# Patient Record
Sex: FEMALE | Race: WHITE
[De-identification: names, ages, dates, MRNs, and addresses within clinical notes are randomized per-mention and may not be internally consistent; named-entity substitution may affect disease eponyms.]

---

## 2017-09-09 ENCOUNTER — HEALTH MAINTENANCE LETTER (OUTPATIENT)
Age: 27
End: 2017-09-09

## 2018-07-05 ENCOUNTER — OFFICE VISIT (OUTPATIENT)
Dept: URGENT CARE | Facility: URGENT CARE | Age: 28
End: 2018-07-05
Payer: COMMERCIAL

## 2018-07-05 VITALS
SYSTOLIC BLOOD PRESSURE: 126 MMHG | TEMPERATURE: 97 F | HEART RATE: 70 BPM | WEIGHT: 200 LBS | BODY MASS INDEX: 27.12 KG/M2 | DIASTOLIC BLOOD PRESSURE: 78 MMHG
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
## 2018-07-05 DIAGNOSIS — L08.9 INFECTED INSECT BITE OF RIGHT FOOT, INITIAL ENCOUNTER: Primary | ICD-10-CM

## 2018-07-05 DIAGNOSIS — S90.861A INFECTED INSECT BITE OF RIGHT FOOT, INITIAL ENCOUNTER: Primary | ICD-10-CM

## 2018-07-05 PROCEDURE — 99203 OFFICE O/P NEW LOW 30 MIN: CPT | Performed by: INTERNAL MEDICINE

## 2018-07-05 RX ORDER — CEPHALEXIN 500 MG/1
500 CAPSULE ORAL 3 TIMES DAILY
Qty: 21 CAPSULE | Refills: 0 | Status: SHIPPED | OUTPATIENT
Start: 2018-07-05 | End: 2018-07-12

## 2018-07-05 ASSESSMENT — ENCOUNTER SYMPTOMS: FEVER: 0

## 2018-07-05 NOTE — LETTER
[facility]  [address] [address]  Phone: [phone]    July 5, 2018        Megan M Goltz  [address] [address]          To whom it may concern:    RE: Megan M Goltz    Patient was seen and treated today at our clinic.  May miss up to 3 days of work for illness.    Please contact me for questions or concerns.      Sincerely,        Mariel Bailey MD

## 2018-07-05 NOTE — MR AVS SNAPSHOT
After Visit Summary   7/5/2018    Megan M Goltz    MRN: [id number]           Patient Information     Date Of Birth          1990        Visit Information        Provider Department      7/5/2018 8:15 PM Mariel Bailey MD [facility]        Today's Diagnoses     Infected insect bite of right foot, initial encounter    -  1      Care Instructions    Keflex antibiotics 3 x day for 1 week  claritin or zyrtec  Cool compress.    Watch for spreading beyond markings, fever, concerns.    Call or return to clinic if symptoms worsen or fail to improve as anticipated.        Infected Insect Bite or Sting    When an insect stings you, it injects venom. When an insect bites you, it does not. Stings and bites may cause a local reaction. Or they may cause a reaction that affects your whole body. Bites and stings may become infected. Signs of infection include redness, warmth, pain, drainage of pus, and swelling. Infections will need treatment with antibiotics and should get better over the next 10 days.  Home care  The following will help you care for your bite or sting at home:    If a stinger is still in your skin, it will need to be removed. Don't use tweezers. Gently scrape the stinger from the side with a firm object such as the side of a credit card. This will loosen it and remove it from your skin.    If itching is a problem, applying ice packs to the sting area will help.    Wash the area with soap and water at least 3 times a day. Apply a topical antibiotic cream or ointment.    You can use an over-the counter antihistamine unless your doctor has given you a prescription antihistamine. You may use antihistamines to reduce itching if large areas of the skin are involved. Use lower doses during the daytime and higher doses at bedtime since the drug may make you sleepy. Don't use an antihistamine if you have glaucoma or if you are a man with trouble urinating due to an  enlarged prostate. Some antihistamines cause less drowsiness and are a good choice for daytime use.    If oral antibiotics have been prescribed, be sure to take them as directed until they are all finished.    You may use over-the-counter pain medicine to control pain, unless another pain medicine was prescribed. Talk with your doctor before using acetaminophen or ibuprofen if you have chronic liver or kidney disease. Also talk with your doctor if you have ever had a stomach ulcer or gastrointestinal bleeding.  Follow-up care  Follow up with your healthcare provider, or as advised if you don't get better over the next 2 days or if your symptoms get worse.  Call 911  Call 911 if any of these occur:    Swelling of the face, eyelids, mouth, throat, or tongue    Difficulty swallowing or breathing  When to seek medical advice  Call your healthcare provider right away if any of these occur:    Spreading areas of redness or swelling    Fever of 100.4 F (38 C) or higher, or as directed by your healthcare provider    Increased local pain    Headache, fever, chills, muscle or joint aching, or vomiting,    New rash  Date Last Reviewed: 10/1/2016    0148-5948 [employer]. [address] [place] [address]. All rights reserved. This information is not intended as a substitute for professional medical care. Always follow your healthcare professional's instructions.                Follow-ups after your visit        Who to contact     If you have questions or need follow up information about today's clinic visit or your schedule please contact [facility] directly at [phone].  Normal or non-critical lab and imaging results will be communicated to you by MyChart, letter or phone within 4 business days after the clinic has received the results. If you do not hear from us within 7 days, please contact the clinic through MyChart or phone. If you have a critical or abnormal lab result,  we will notify you by phone as soon as possible.  Submit refill requests through [email or web address] or call your pharmacy and they will forward the refill request to us. Please allow 3 business days for your refill to be completed.          Additional Information About Your Visit        Care EveryWhere ID     This is your Care EveryWhere ID. This could be used by other organizations to access your [facility] records  [id number]        Your Vitals Were     Pulse Temperature BMI (Body Mass Index)             70 97  F (36.1  C) (Tympanic) 27.12 kg/m2          Blood Pressure from Last 3 Encounters:   07/05/18 126/78   07/09/14 128/72   08/11/11 122/80    Weight from Last 3 Encounters:   07/05/18 200 lb (90.7 kg)   07/09/14 189 lb (85.7 kg)   08/11/11 196 lb (88.9 kg) (97 %)*     * Growth percentiles are based on [facility] 2-20 Years data.              Today, you had the following     No orders found for display         Today's Medication Changes          These changes are accurate as of 7/5/18  8:51 PM.  If you have any questions, ask your nurse or doctor.               Start taking these medicines.        Dose/Directions    cephALEXin 500 MG capsule   Commonly known as:  KEFLEX   Used for:  Infected insect bite of right foot, initial encounter   Started by:  Mariel Bailey MD        Dose:  500 mg   Take 1 capsule (500 mg) by mouth 3 times daily for 7 days   Quantity:  21 capsule   Refills:  0            Where to get your medicines      [facility] [address] [place] [address] AT [address] [address]    Hours:  24-hours Phone:  [phone]     cephALEXin 500 MG capsule                Primary Care Provider Office Phone # Fax #    Dary Wing [phone]       [address] [place] [address]        Equal Access to Services     KASSIDY SCOTT AH: Tasia Case,  wadarioda luqvilma, qaybta kaindu jacobson, zaira beardenaan ah. So [facility] [phone].    ATENCIÓN: Si shannon valle, tiene a baird disposición servicios gratuitos de asistencia lingüística. Nataly al [phone].    We comply with applicable federal civil rights laws and [place] laws. We do not discriminate on the basis of race, color, national origin, age, disability, sex, sexual orientation, or gender identity.            Thank you!     Thank you for choosing [facility]  for your care. Our goal is always to provide you with excellent care. Hearing back from our patients is one way we can continue to improve our services. Please take a few minutes to complete the written survey that you may receive in the mail after your visit with us. Thank you!             Your Updated Medication List - Protect others around you: Learn how to safely use, store and throw away your medicines at www.disposemymeds.org.          This list is accurate as of 7/5/18  8:51 PM.  Always use your most recent med list.                   Brand Name Dispense Instructions for use Diagnosis    albuterol 108 (90 Base) MCG/ACT Inhaler    PROAIR HFA/PROVENTIL HFA/VENTOLIN HFA    1 Inhaler    Inhale 2 puffs into the lungs every 6 hours as needed for shortness of breath / dyspnea or wheezing    Mild intermittent asthma, uncomplicated       cephALEXin 500 MG capsule    KEFLEX    21 capsule    Take 1 capsule (500 mg) by mouth 3 times daily for 7 days    Infected insect bite of right foot, initial encounter       CLARITIN 10 MG tablet   Generic drug:  loratadine          1 TAB PO QD (Once per day) as needed for ALLERGY SYMPTOMS        NUVARING 0.12-0.015 MG/24HR vaginal ring   Generic drug:  etonogestrel-ethinyl estradiol     3 each    PLACE 1 RING EVERY 21 DAYS THEN REMOVE FOR 1 WEEK.    Unspecified contraceptive management       peak flow meter Lavinia     1    use prn.        valACYclovir 500 MG tablet     VALTREX    16 tablet    4 po q 12hrs x 1 d (cold sores)    Herpes simplex without mention of complication

## 2018-07-06 NOTE — PROGRESS NOTES
SUBJECTIVE:   Megan M Goltz is a 27 year old female presenting with a chief complaint of   Chief Complaint   Patient presents with     Insect Bites     bottom of Rt foot x 2 days, it is red with a white ring around it, states it stings and is painful, states is allergic to deer fly bite.       She is a new patient of [facility].    Bite/Sting    Onset of symptoms was 2 day(s) ago.    Location: right bottom foot  Context: [place] friend[place] this past weekend  Current and Associated symptoms: pain and throbbing  Slight itch  Hard to stand at work  Treatment measures tried include: topical crushed up to benadryl tabs  Relief from treatment: none  Significant past medical history: history of prior reactions  -  Needed steroids in past    Review of Systems   Constitutional: Negative for fever.       Past Medical History:   Diagnosis Date     Mild intermittent asthma     worse with exercise     Family History   Problem Relation Age of Onset     Diabetes Maternal Grandfather      type II     Hypertension Paternal Grandfather      Respiratory Maternal Aunt      bad asthma and allergies     Respiratory Paternal Uncle      asthma and allergies     Current Outpatient Prescriptions   Medication Sig Dispense Refill     albuterol (PROAIR HFA, PROVENTIL HFA, VENTOLIN HFA) 108 (90 BASE) MCG/ACT inhaler Inhale 2 puffs into the lungs every 6 hours as needed for shortness of breath / dyspnea or wheezing 1 Inhaler 0     cephALEXin (KEFLEX) 500 MG capsule Take 1 capsule (500 mg) by mouth 3 times daily for 7 days 21 capsule 0     CLARITIN 10 MG OR TABS 1 TAB PO QD (Once per day) as needed for ALLERGY SYMPTOMS       NUVARING 0.12-0.015 MG/24HR vaginal ring PLACE 1 RING EVERY 21 DAYS THEN REMOVE FOR 1 WEEK. 3 each 2     PEAK FLOW METER EDUAR use prn.  1 0     valACYclovir (VALTREX) 500 MG tablet 4 po q 12hrs x 1 d (cold sores) 16 tablet 3     Social History   Substance Use Topics     Smoking status: Never Smoker     Smokeless tobacco: Never  Used     Alcohol use Yes      Comment: occ.          OBJECTIVE  /78  Pulse 70  Temp 97  F (36.1  C) (Tympanic)  Wt 200 lb (90.7 kg)  BMI 27.12 kg/m2    Physical Exam   Constitutional: She appears well-developed and well-nourished.   Skin:   right foot -   1cm central area firm with surrounding red/warm skin 3 x 5 cm   Area tender       Labs:  No results found for this or any previous visit (from the past 24 hour(s)).        ASSESSMENT:      ICD-10-CM    1. Infected insect bite of right foot, initial encounter S90.861A cephALEXin (KEFLEX) 500 MG capsule    L08.9     W57.XXXA         Medical Decision Making:  Elevate extremity  Work note for up to 3 days  Call or return to clinic if symptoms worsen or fail to improve as anticipated.      Patient Instructions   Keflex antibiotics 3 x day for 1 week  claritin or zyrtec  Cool compress.    Watch for spreading beyond markings, fever, concerns.    Call or return to clinic if symptoms worsen or fail to improve as anticipated.        Infected Insect Bite or Sting    When an insect stings you, it injects venom. When an insect bites you, it does not. Stings and bites may cause a local reaction. Or they may cause a reaction that affects your whole body. Bites and stings may become infected. Signs of infection include redness, warmth, pain, drainage of pus, and swelling. Infections will need treatment with antibiotics and should get better over the next 10 days.  Home care  The following will help you care for your bite or sting at home:    If a stinger is still in your skin, it will need to be removed. Don't use tweezers. Gently scrape the stinger from the side with a firm object such as the side of a credit card. This will loosen it and remove it from your skin.    If itching is a problem, applying ice packs to the sting area will help.    Wash the area with soap and water at least 3 times a day. Apply a topical antibiotic cream or ointment.    You can use an over-the  counter antihistamine unless your doctor has given you a prescription antihistamine. You may use antihistamines to reduce itching if large areas of the skin are involved. Use lower doses during the daytime and higher doses at bedtime since the drug may make you sleepy. Don't use an antihistamine if you have glaucoma or if you are a man with trouble urinating due to an enlarged prostate. Some antihistamines cause less drowsiness and are a good choice for daytime use.    If oral antibiotics have been prescribed, be sure to take them as directed until they are all finished.    You may use over-the-counter pain medicine to control pain, unless another pain medicine was prescribed. Talk with your doctor before using acetaminophen or ibuprofen if you have chronic liver or kidney disease. Also talk with your doctor if you have ever had a stomach ulcer or gastrointestinal bleeding.  Follow-up care  Follow up with your healthcare provider, or as advised if you don't get better over the next 2 days or if your symptoms get worse.  Call 911  Call 911 if any of these occur:    Swelling of the face, eyelids, mouth, throat, or tongue    Difficulty swallowing or breathing  When to seek medical advice  Call your healthcare provider right away if any of these occur:    Spreading areas of redness or swelling    Fever of 100.4 F (38 C) or higher, or as directed by your healthcare provider    Increased local pain    Headache, fever, chills, muscle or joint aching, or vomiting,    New rash  Date Last Reviewed: 10/1/2016    6986-5366 [employer]. [address] [place] [address], [place] [address]. All rights reserved. This information is not intended as a substitute for professional medical care. Always follow your healthcare professional's instructions.

## 2018-07-06 NOTE — PATIENT INSTRUCTIONS
Keflex antibiotics 3 x day for 1 week  claritin or zyrtec  Cool compress.    Watch for spreading beyond markings, fever, concerns.    Call or return to clinic if symptoms worsen or fail to improve as anticipated.        Infected Insect Bite or Sting    When an insect stings you, it injects venom. When an insect bites you, it does not. Stings and bites may cause a local reaction. Or they may cause a reaction that affects your whole body. Bites and stings may become infected. Signs of infection include redness, warmth, pain, drainage of pus, and swelling. Infections will need treatment with antibiotics and should get better over the next 10 days.  Home care  The following will help you care for your bite or sting at home:    If a stinger is still in your skin, it will need to be removed. Don't use tweezers. Gently scrape the stinger from the side with a firm object such as the side of a credit card. This will loosen it and remove it from your skin.    If itching is a problem, applying ice packs to the sting area will help.    Wash the area with soap and water at least 3 times a day. Apply a topical antibiotic cream or ointment.    You can use an over-the counter antihistamine unless your doctor has given you a prescription antihistamine. You may use antihistamines to reduce itching if large areas of the skin are involved. Use lower doses during the daytime and higher doses at bedtime since the drug may make you sleepy. Don't use an antihistamine if you have glaucoma or if you are a man with trouble urinating due to an enlarged prostate. Some antihistamines cause less drowsiness and are a good choice for daytime use.    If oral antibiotics have been prescribed, be sure to take them as directed until they are all finished.    You may use over-the-counter pain medicine to control pain, unless another pain medicine was prescribed. Talk with your doctor before using acetaminophen or ibuprofen if you have chronic liver or  kidney disease. Also talk with your doctor if you have ever had a stomach ulcer or gastrointestinal bleeding.  Follow-up care  Follow up with your healthcare provider, or as advised if you don't get better over the next 2 days or if your symptoms get worse.  Call 911  Call 911 if any of these occur:    Swelling of the face, eyelids, mouth, throat, or tongue    Difficulty swallowing or breathing  When to seek medical advice  Call your healthcare provider right away if any of these occur:    Spreading areas of redness or swelling    Fever of 100.4 F (38 C) or higher, or as directed by your healthcare provider    Increased local pain    Headache, fever, chills, muscle or joint aching, or vomiting,    New rash  Date Last Reviewed: 10/1/2016    3499-8110 [employer]. [address] [place] [address], [place] [address]. All rights reserved. This information is not intended as a substitute for professional medical care. Always follow your healthcare professional's instructions.

## 2018-08-03 ENCOUNTER — OFFICE VISIT (OUTPATIENT)
Dept: URGENT CARE | Facility: URGENT CARE | Age: 28
End: 2018-08-03
Payer: COMMERCIAL

## 2018-08-03 VITALS
HEART RATE: 80 BPM | TEMPERATURE: 99.1 F | SYSTOLIC BLOOD PRESSURE: 110 MMHG | OXYGEN SATURATION: 97 % | BODY MASS INDEX: 26.55 KG/M2 | WEIGHT: 196 LBS | DIASTOLIC BLOOD PRESSURE: 68 MMHG | HEIGHT: 72 IN

## 2018-08-03 DIAGNOSIS — R07.0 THROAT PAIN: ICD-10-CM

## 2018-08-03 DIAGNOSIS — J03.90 EXUDATIVE TONSILLITIS: Primary | ICD-10-CM

## 2018-08-03 LAB
DEPRECATED S PYO AG THROAT QL EIA: NORMAL
SPECIMEN SOURCE: NORMAL

## 2018-08-03 PROCEDURE — 87081 CULTURE SCREEN ONLY: CPT | Performed by: INTERNAL MEDICINE

## 2018-08-03 PROCEDURE — 99213 OFFICE O/P EST LOW 20 MIN: CPT | Performed by: INTERNAL MEDICINE

## 2018-08-03 PROCEDURE — 87880 STREP A ASSAY W/OPTIC: CPT | Performed by: INTERNAL MEDICINE

## 2018-08-03 RX ORDER — COPPER 313.4 MG/1
1 INTRAUTERINE DEVICE INTRAUTERINE ONCE
COMMUNITY

## 2018-08-03 ASSESSMENT — ENCOUNTER SYMPTOMS
COUGH: 0
FEVER: 0

## 2018-08-03 NOTE — PROGRESS NOTES
SUBJECTIVE:   Megan M Goltz is a 27 year old female presenting with a chief complaint of   Chief Complaint   Patient presents with     Urgent Care     Pharyngitis     3 days ago started to have sore throat, headache, body aches; today is hard to swallow.       She is an established patient of [facility].    URI Adult    Onset of symptoms was 3 day(s) ago.  Course of illness is worsening.    Current and Associated symptoms: sore throat, headache and body aches  Treatment measures tried include Tylenol/Ibuprofen.  Predisposing factors include ill contact: Work.        Review of Systems   Constitutional: Negative for fever.   HENT: Positive for postnasal drip.    Respiratory: Negative for cough.        Past Medical History:   Diagnosis Date     Mild intermittent asthma     worse with exercise     Family History   Problem Relation Age of Onset     Diabetes Maternal Grandfather      type II     Hypertension Paternal Grandfather      Respiratory Maternal Aunt      bad asthma and allergies     Respiratory Paternal Uncle      asthma and allergies     Current Outpatient Prescriptions   Medication Sig Dispense Refill     paragard intrauterine copper 1 each by Intrauterine route once       albuterol (PROAIR HFA, PROVENTIL HFA, VENTOLIN HFA) 108 (90 BASE) MCG/ACT inhaler Inhale 2 puffs into the lungs every 6 hours as needed for shortness of breath / dyspnea or wheezing (Patient not taking: Reported on 8/3/2018) 1 Inhaler 0     CLARITIN 10 MG OR TABS 1 TAB PO QD (Once per day) as needed for ALLERGY SYMPTOMS (Patient not taking: Reported on 8/3/2018)       NUVARING 0.12-0.015 MG/24HR vaginal ring PLACE 1 RING EVERY 21 DAYS THEN REMOVE FOR 1 WEEK. (Patient not taking: Reported on 8/3/2018) 3 each 2     PEAK FLOW METER EDUAR use prn.  1 0     valACYclovir (VALTREX) 500 MG tablet 4 po q 12hrs x 1 d (cold sores) (Patient not taking: Reported on 8/3/2018) 16 tablet 3     Social History   Substance Use Topics     Smoking status: Never  "Smoker     Smokeless tobacco: Never Used     Alcohol use Yes      Comment: occ.          OBJECTIVE  /68  Pulse 80  Temp 99.1  F (37.3  C) (Oral)  Ht 6' 1\" (1.854 m)  Wt 196 lb (88.9 kg)  LMP 08/01/2018 (Exact Date)  SpO2 97%  BMI 25.86 kg/m2    Physical Exam   Constitutional: She appears well-developed and well-nourished.   HENT:   Nose: Nose normal.   Mouth/Throat: Oropharyngeal exudate present.   Tm nl b     Cardiovascular: Normal rate, regular rhythm and normal heart sounds.    Pulmonary/Chest: Effort normal and breath sounds normal.   Lymphadenopathy:     She has cervical adenopathy.   Vitals reviewed.      Labs:  Results for orders placed or performed in visit on 08/03/18 (from the past 24 hour(s))   Strep, Rapid Screen   Result Value Ref Range    Specimen Description Throat     Rapid Strep A Screen       NEGATIVE: No Group A streptococcal antigen detected by immunoassay, await culture report.           ASSESSMENT:      ICD-10-CM    1. Exudative tonsillitis J03.90    2. Throat pain R07.0 Strep, Rapid Screen     Beta strep group A culture        Medical Decision Making:    Differential Diagnosis:  URI Adult/Peds:  Strep pharyngitis, Tonsilitis and Viral pharyngitis    Serious Comorbid Conditions:  Adult:  None    PLAN:    URI Adult:  Tylenol, Ibuprofen, Fluids and Rest  No ABX given  Followup:    If not improving or if condition worsens, follow up with your Primary Care Provider    Patient Instructions   rapid strep test negative   throat culture pending    Fluids  Rest           "

## 2018-08-03 NOTE — MR AVS SNAPSHOT
"              After Visit Summary   8/3/2018    Megan M Goltz    MRN: [id number]           Patient Information     Date Of Birth          1990        Visit Information        Provider Department      8/3/2018 5:55 PM Mariel Bailey MD [facility]        Today's Diagnoses     Throat pain    -  1       Follow-ups after your visit        Who to contact     If you have questions or need follow up information about today's clinic visit or your schedule please contact [facility] directly at [phone].  Normal or non-critical lab and imaging results will be communicated to you by MyChart, letter or phone within 4 business days after the clinic has received the results. If you do not hear from us within 7 days, please contact the clinic through MyChart or phone. If you have a critical or abnormal lab result, we will notify you by phone as soon as possible.  Submit refill requests through [employer] or call your pharmacy and they will forward the refill request to us. Please allow 3 business days for your refill to be completed.          Additional Information About Your Visit        Care EveryWhere ID     This is your Care EveryWhere ID. This could be used by other organizations to access your [place] medical records  [id number]        Your Vitals Were     Pulse Temperature Height Last Period Pulse Oximetry BMI (Body Mass Index)    80 99.1  F (37.3  C) (Oral) 6' 1\" (1.854 m) 08/01/2018 (Exact Date) 97% 25.86 kg/m2       Blood Pressure from Last 3 Encounters:   08/03/18 110/68   07/05/18 126/78   07/09/14 128/72    Weight from Last 3 Encounters:   08/03/18 196 lb (88.9 kg)   07/05/18 200 lb (90.7 kg)   07/09/14 189 lb (85.7 kg)              We Performed the Following     Strep, Rapid Screen        Primary Care Provider Office Phone # Fax #    Dary Wing [phone] [address] [facility] [address]        Equal Access " to Services     KASSIDY SCOTT : Tasia Case, wakwan sin, qachantal kabaldomerozaira hammond. So [facility] [phone].    ATENCIÓN: Si habla tori, tiene a baird disposición servicios gratuitos de asistencia lingüística. Llame al [phone].    We comply with applicable federal civil rights laws and [place] laws. We do not discriminate on the basis of race, color, national origin, age, disability, sex, sexual orientation, or gender identity.            Thank you!     Thank you for choosing [facility]  for your care. Our goal is always to provide you with excellent care. Hearing back from our patients is one way we can continue to improve our services. Please take a few minutes to complete the written survey that you may receive in the mail after your visit with us. Thank you!             Your Updated Medication List - Protect others around you: Learn how to safely use, store and throw away your medicines at www.disposemymeds.org.          This list is accurate as of 8/3/18  6:15 PM.  Always use your most recent med list.                   Brand Name Dispense Instructions for use Diagnosis    albuterol 108 (90 Base) MCG/ACT Inhaler    PROAIR HFA/PROVENTIL HFA/VENTOLIN HFA    1 Inhaler    Inhale 2 puffs into the lungs every 6 hours as needed for shortness of breath / dyspnea or wheezing    Mild intermittent asthma, uncomplicated       CLARITIN 10 MG tablet   Generic drug:  loratadine          1 TAB PO QD (Once per day) as needed for ALLERGY SYMPTOMS        NUVARING 0.12-0.015 MG/24HR vaginal ring   Generic drug:  etonogestrel-ethinyl estradiol     3 each    PLACE 1 RING EVERY 21 DAYS THEN REMOVE FOR 1 WEEK.    Unspecified contraceptive management       paragard intrauterine copper      1 each by Intrauterine route once        peak flow meter Lavinia     1    use prn.        valACYclovir 500 MG tablet    VALTREX    16 tablet    4 po q 12hrs x 1  d (cold sores)    Herpes simplex without mention of complication

## 2018-08-04 LAB
BACTERIA SPEC CULT: NORMAL
SPECIMEN SOURCE: NORMAL

## 2018-08-06 ENCOUNTER — NURSE TRIAGE (OUTPATIENT)
Dept: NURSING | Facility: CLINIC | Age: 28
End: 2018-08-06

## 2018-08-06 NOTE — TELEPHONE ENCOUNTER
Additional Information    Health Information question, no triage required and triager able to answer question    Protocols used: INFORMATION ONLY CALL-ADULT-AH